# Patient Record
Sex: FEMALE | Race: WHITE | NOT HISPANIC OR LATINO | URBAN - METROPOLITAN AREA
[De-identification: names, ages, dates, MRNs, and addresses within clinical notes are randomized per-mention and may not be internally consistent; named-entity substitution may affect disease eponyms.]

---

## 2022-10-31 ENCOUNTER — EMERGENCY (EMERGENCY)
Facility: HOSPITAL | Age: 52
LOS: 1 days | Discharge: HOME | End: 2022-10-31
Attending: EMERGENCY MEDICINE | Admitting: EMERGENCY MEDICINE

## 2022-10-31 VITALS
HEART RATE: 206 BPM | OXYGEN SATURATION: 100 % | TEMPERATURE: 98 F | WEIGHT: 139.99 LBS | SYSTOLIC BLOOD PRESSURE: 119 MMHG | RESPIRATION RATE: 18 BRPM | DIASTOLIC BLOOD PRESSURE: 81 MMHG | HEIGHT: 69 IN

## 2022-10-31 VITALS
HEART RATE: 79 BPM | DIASTOLIC BLOOD PRESSURE: 78 MMHG | OXYGEN SATURATION: 98 % | SYSTOLIC BLOOD PRESSURE: 125 MMHG | RESPIRATION RATE: 17 BRPM

## 2022-10-31 LAB
ALBUMIN SERPL ELPH-MCNC: 4 G/DL — SIGNIFICANT CHANGE UP (ref 3.4–5)
ALP SERPL-CCNC: 80 U/L — SIGNIFICANT CHANGE UP (ref 40–120)
ALT FLD-CCNC: 36 U/L — SIGNIFICANT CHANGE UP (ref 12–42)
ANION GAP SERPL CALC-SCNC: 12 MMOL/L — SIGNIFICANT CHANGE UP (ref 9–16)
AST SERPL-CCNC: 36 U/L — SIGNIFICANT CHANGE UP (ref 15–37)
BASOPHILS # BLD AUTO: 0.05 K/UL — SIGNIFICANT CHANGE UP (ref 0–0.2)
BASOPHILS NFR BLD AUTO: 0.6 % — SIGNIFICANT CHANGE UP (ref 0–2)
BILIRUB SERPL-MCNC: 0.3 MG/DL — SIGNIFICANT CHANGE UP (ref 0.2–1.2)
BUN SERPL-MCNC: 19 MG/DL — SIGNIFICANT CHANGE UP (ref 7–23)
CALCIUM SERPL-MCNC: 9.4 MG/DL — SIGNIFICANT CHANGE UP (ref 8.5–10.5)
CHLORIDE SERPL-SCNC: 100 MMOL/L — SIGNIFICANT CHANGE UP (ref 96–108)
CO2 SERPL-SCNC: 27 MMOL/L — SIGNIFICANT CHANGE UP (ref 22–31)
CREAT SERPL-MCNC: 0.83 MG/DL — SIGNIFICANT CHANGE UP (ref 0.5–1.3)
EGFR: 85 ML/MIN/1.73M2 — SIGNIFICANT CHANGE UP
EOSINOPHIL # BLD AUTO: 0.08 K/UL — SIGNIFICANT CHANGE UP (ref 0–0.5)
EOSINOPHIL NFR BLD AUTO: 1 % — SIGNIFICANT CHANGE UP (ref 0–6)
GLUCOSE SERPL-MCNC: 137 MG/DL — HIGH (ref 70–99)
HCG SERPL-ACNC: 7 MIU/ML — SIGNIFICANT CHANGE UP
HCT VFR BLD CALC: 40.8 % — SIGNIFICANT CHANGE UP (ref 34.5–45)
HGB BLD-MCNC: 13.4 G/DL — SIGNIFICANT CHANGE UP (ref 11.5–15.5)
IMM GRANULOCYTES NFR BLD AUTO: 0.3 % — SIGNIFICANT CHANGE UP (ref 0–0.9)
LYMPHOCYTES # BLD AUTO: 3.57 K/UL — HIGH (ref 1–3.3)
LYMPHOCYTES # BLD AUTO: 46.2 % — HIGH (ref 13–44)
MAGNESIUM SERPL-MCNC: 1.8 MG/DL — SIGNIFICANT CHANGE UP (ref 1.6–2.6)
MCHC RBC-ENTMCNC: 30 PG — SIGNIFICANT CHANGE UP (ref 27–34)
MCHC RBC-ENTMCNC: 32.8 GM/DL — SIGNIFICANT CHANGE UP (ref 32–36)
MCV RBC AUTO: 91.5 FL — SIGNIFICANT CHANGE UP (ref 80–100)
MONOCYTES # BLD AUTO: 0.68 K/UL — SIGNIFICANT CHANGE UP (ref 0–0.9)
MONOCYTES NFR BLD AUTO: 8.8 % — SIGNIFICANT CHANGE UP (ref 2–14)
NEUTROPHILS # BLD AUTO: 3.33 K/UL — SIGNIFICANT CHANGE UP (ref 1.8–7.4)
NEUTROPHILS NFR BLD AUTO: 43.1 % — SIGNIFICANT CHANGE UP (ref 43–77)
NRBC # BLD: 0 /100 WBCS — SIGNIFICANT CHANGE UP (ref 0–0)
NT-PROBNP SERPL-SCNC: 151 PG/ML — SIGNIFICANT CHANGE UP
PLATELET # BLD AUTO: 225 K/UL — SIGNIFICANT CHANGE UP (ref 150–400)
POTASSIUM SERPL-MCNC: 3.8 MMOL/L — SIGNIFICANT CHANGE UP (ref 3.5–5.3)
POTASSIUM SERPL-SCNC: 3.8 MMOL/L — SIGNIFICANT CHANGE UP (ref 3.5–5.3)
PROT SERPL-MCNC: 7.7 G/DL — SIGNIFICANT CHANGE UP (ref 6.4–8.2)
RBC # BLD: 4.46 M/UL — SIGNIFICANT CHANGE UP (ref 3.8–5.2)
RBC # FLD: 12.5 % — SIGNIFICANT CHANGE UP (ref 10.3–14.5)
SODIUM SERPL-SCNC: 139 MMOL/L — SIGNIFICANT CHANGE UP (ref 132–145)
TROPONIN I, HIGH SENSITIVITY RESULT: 4 NG/L — SIGNIFICANT CHANGE UP
TSH SERPL-MCNC: 7.13 UIU/ML — HIGH (ref 0.36–3.74)
WBC # BLD: 7.73 K/UL — SIGNIFICANT CHANGE UP (ref 3.8–10.5)
WBC # FLD AUTO: 7.73 K/UL — SIGNIFICANT CHANGE UP (ref 3.8–10.5)

## 2022-10-31 PROCEDURE — 93010 ELECTROCARDIOGRAM REPORT: CPT | Mod: NC

## 2022-10-31 PROCEDURE — 99285 EMERGENCY DEPT VISIT HI MDM: CPT

## 2022-10-31 RX ORDER — SODIUM CHLORIDE 9 MG/ML
1000 INJECTION INTRAMUSCULAR; INTRAVENOUS; SUBCUTANEOUS ONCE
Refills: 0 | Status: COMPLETED | OUTPATIENT
Start: 2022-10-31 | End: 2022-10-31

## 2022-10-31 RX ADMIN — SODIUM CHLORIDE 1000 MILLILITER(S): 9 INJECTION INTRAMUSCULAR; INTRAVENOUS; SUBCUTANEOUS at 21:10

## 2022-10-31 NOTE — ED PROVIDER NOTE - PATIENT PORTAL LINK FT
You can access the FollowMyHealth Patient Portal offered by [facility] by registering at the following website: [email or web address]. By joining [facility] [facility] FollowMyHealth portal, you will also be able to view your health information using other applications (apps) compatible with our system.

## 2022-10-31 NOTE — ED PROVIDER NOTE - OBJECTIVE STATEMENT
53yo F no significant pmh presents with palpitations that started just prior to arrival in ED.  Pt's watch told her her heart rate was >200 so she came to ED.  No lightheadedness or syncope.  No chest pain or shortness of breath.  had an extra cup of tea this afternoon, otherwise no significant change in caffeine intake.  No drugs or etoh.  Has had palpitations in the past which have resolved on their own; has never gone to ED for arrhythmia.

## 2022-10-31 NOTE — ED PROVIDER NOTE - CARE PROVIDER_API CALL
Maurilio Bettencourt)  Cardiovascular Disease  [address] [facility], [address] [place] [address]  Phone: [phone]  Fax: [phone]  Follow Up Time: 1-3 Days

## 2022-10-31 NOTE — ED ADULT TRIAGE NOTE - AS HEIGHT TYPE
stated Quality 110: Preventive Care And Screening: Influenza Immunization: Influenza Immunization not Administered because Patient Refused. Quality 128: Preventive Care And Screening: Body Mass Index (Bmi) Screening And Follow-Up Plan: BMI is documented within normal parameters and no follow-up plan is required. Quality 226: Preventive Care And Screening: Tobacco Use: Screening And Cessation Intervention: Patient screened for tobacco and never smoked Detail Level: Detailed Quality 130: Documentation Of Current Medications In The Medical Record: Current Medications Documented

## 2022-10-31 NOTE — ED ADULT TRIAGE NOTE - CHIEF COMPLAINT QUOTE
Pt. walk in c/o palpitations that started suddenly at around 845p, pt. looked at apple watch and saw . Denies CP, n/v, dizziness, SOB.

## 2022-10-31 NOTE — ED PROVIDER NOTE - PHYSICAL EXAMINATION
Constitutional:  anxious appearing, awake, alert, oriented to person, place, time/situation  HEENT: Airway patent, Nasal mucosa clear. Mouth with normal mucosa.   Eyes: Clear bilaterally, pupils equal, round and reactive to light.  Cardiac: tachycardic regular rhythm.  Respiratory: Breath sounds clear and equal bilaterally.  GI: Abdomen soft, non-tender, no guarding.  MSK: Spine appears normal, range of motion is not limited, no muscle or joint tenderness  Neuro: Alert and oriented, no focal deficits, no motor or sensory deficits.  Skin: Skin normal color for race, warm, dry and intact. No evidence of rash.

## 2022-10-31 NOTE — ED PROVIDER NOTE - NSFOLLOWUPINSTRUCTIONS_ED_ALL_ED_FT
Supraventricular Tachycardia, Adult      Supraventricular tachycardia (SVT) is a type of abnormal heart rhythm. It causes the heart to beat very quickly. SVT can start suddenly and last for a short time, which is called paroxysmal SVT, or it may last longer and require specialized treatment to return the heart rhythm to normal.    A normal resting heart rate is 60–100 beats per minute. During an episode of SVT, your heart rate may be higher than 150 beats per minute. Episodes of SVT can be frightening, but they are usually not dangerous. However, if episodes happen several times a day or last longer than a few seconds, they may lead to heart failure.      What are the causes?     Usually, a normal heartbeat starts when an area called the sinoatrial node releases an electrical signal. In SVT, other areas of the heart send out electrical signals that interfere with the signal from the sinoatrial node. The cause of this abnormal electrical activity is not known.      What increases the risk?    You are more likely to develop this condition if you are:  •Middle aged or younger.      •Female.      The following factors may also make you more likely to develop this condition:  •Stress or anxiety.      •Tiredness.      •Smoking.      •Stimulant drugs, such as cocaine and methamphetamine.      •Alcohol.      •Caffeine.      •Pregnancy.    •Having any of these conditions:  •A thyroid condition.      •Diabetes mellitus.      •Obstructive sleep apnea.          What are the signs or symptoms?    Symptoms of this condition include:  •A pounding heart.      •A feeling that the heart is skipping beats (palpitations).      •Weakness.      •Shortness of breath.      •Tightness or pain in your chest.      •Light-headedness or dizziness.      •Anxiety.      •Sweating.      •Nausea.      •Fainting.      •Fatigue or tiredness.      A mild episode may not cause symptoms.      How is this diagnosed?    This condition may be diagnosed based on:  •Your symptoms.      •A physical exam. If you have an episode of SVT during the exam, the health care provider may be able to diagnose SVT by listening to your heart and feeling your pulse.    •Tests. These may include:  •An electrocardiogram (ECG). This test is done to check for problems with electrical activity in the heart.      •A Holter monitor or event monitor test. This test involves wearing a portable device that monitors your heart rate over time.      •An echocardiogram. This test involves taking an image of your heart using sound waves. It is done to rule out other causes of a fast heart rate.      •A stress echocardiogram. This test involves doing an echocardiogram when you are at rest and after exercise.      •Blood tests.      •An electrophysiology study (EPS). This tests the electrical activity in your heart to find where the abnormal heart rhythm is coming from using cardiac catheters.          How is this treated?    This condition may be treated with:•Vagal nerve stimulation. This involves stimulating your vagus nerve, which is a nerve that runs from the chest, through the neck, to the lower part of the brain. Stimulating this nerve can slow down the heart. It is often the first and only treatment that is needed for this condition. Work with your health care provider to find which technique works best for you. Ways to do this treatment include:  •Laying on your back, then holding your breath and pushing, as though you are having a bowel movement.      •Massaging an area on one side of your neck, below your jaw. Do not try this yourself. Only a health care provider should do this. If done the wrong way, it can lead to a stroke.      •Bending forward with your head between your legs.      •Coughing while bending forward with your head between your legs.      •Applying an ice-cold, wet towel to your face.        •Medicines that prevent attacks.      •Medicine to stop an attack. The medicine is given through an IV at the hospital.      •A small electric shock (cardioversion) that stops an attack. Before you get the shock, you will get medicine to make you fall asleep.      •Radiofrequency ablation. In this procedure, a small, thin tube (catheter) is used to send radiofrequency energy to the area of tissue that is causing the rapid heartbeats. The energy kills the cells and helps your heart keep a normal rhythm. You may have this treatment if you have symptoms of SVT often.      If you do not have symptoms, you may not need treatment.      Follow these instructions at home:    Stress     •Avoid stressful situations when possible.    •Find healthy ways of managing stress, such as:  •Taking part in relaxing activities, such as yoga, meditation, or being out in nature.      •Listening to relaxing music.      •Practicing relaxation techniques, such as deep breathing.      •Leading a healthy lifestyle. This involves getting plenty of sleep, exercising, and eating a balanced diet.      •Attending counseling or talk therapy with a mental health professional.          Lifestyle      •Try to get at least 7 hours of sleep each night.      • Do not use any products that contain nicotine or tobacco. These products include cigarettes, chewing tobacco, and vaping devices, such as e-cigarettes. If you need help quitting, ask your health care provider.      • Do not drink alcohol if it triggers episodes of SVT.    •If alcohol does not seem to trigger episodes, limit your alcohol intake. If you drink alcohol:•Limit how much you have to:   •0–1 drink a day for women who are not pregnant.      •0–2 drinks a day for men.        •Know how much alcohol is in your drink. In the [place] one drink equals one 12 oz bottle of beer (355 mL), one 5 oz glass of wine (148 mL), or one 1½ oz glass of hard liquor (44 mL).      •Be aware of how caffeine affects your condition. If caffeine:  •Triggers episodes of SVT, do not eat, drink, or use anything with caffeine in it.      •Does not seem to trigger episodes, consume caffeine in moderation.        • Do not use stimulant drugs. If you need help quitting, talk with your health care provider.      General instructions     •Maintain a healthy weight.    •Exercise regularly. Ask your health care provider to suggest some good activities for you. Aim for one or a combination of the following:  •150 minutes per week of moderate exercise, such as walking or yoga.      •75 minutes per week of vigorous exercise, such as running or swimming.        •Perform vagus nerve stimulation as directed by your health care provider.      •Take over-the-counter and prescription medicines only as told by your health care provider.      •Keep all follow-up visits. This is important.        Contact a health care provider if:    •You have episodes of SVT more often than before.      •Episodes of SVT last longer than before.      •Vagus nerve stimulation is no longer helping.      •You have new symptoms.        Get help right away if:    •You have chest pain.      •Your symptoms get worse.      •You have trouble breathing.      •You have an episode of SVT that lasts longer than 20 minutes.      •You faint.      These symptoms may represent a serious problem that is an emergency. Do not wait to see if the symptoms will go away. Get medical help right away. Call your local emergency services (911 in the [place] Do not drive yourself to the hospital.       Summary    •Supraventricular tachycardia (SVT) is a type of abnormal heart rhythm.      •During an episode of SVT, your heart rate may be higher than 150 beats per minute.      •If you do not have symptoms, you may not need treatment.      This information is not intended to replace advice given to you by your health care provider. Make sure you discuss any questions you have with your health care provider.

## 2022-10-31 NOTE — ED PROVIDER NOTE - CLINICAL SUMMARY MEDICAL DECISION MAKING FREE TEXT BOX
51yo F no significant pmh presents with palpitations which started just prior to arrival in ED.  On exam pt is afebrile, BP wnl, HR >200.  EKG consistent with SVT.  Pt converted to sinus rhythm with vagal maneuvers performed at bedside. 51yo F no significant pmh presents with palpitations which started just prior to arrival in ED.  On exam pt is afebrile, BP wnl, HR >200.  EKG consistent with SVT.  Pt converted to sinus rhythm with vagal maneuvers performed at bedside.  Labs unremarkable.  Trop negative.  Pt feels well after IV hydration and resolution of SVT, wants to go home.  Will refer to cardiology as outpatient.  Stable for dc. 51yo F no significant pmh presents with palpitations which started just prior to arrival in ED.  On exam pt is afebrile, BP wnl, HR >200.  EKG consistent with SVT.  Pt converted to sinus rhythm with vagal maneuvers performed at bedside.  Labs unremarkable.  Trop negative.  Pt feels well after IV hydration and resolution of SVT, wants to go home.  HR remained wnl for >1hr of obs in ED.  Will refer to cardiology as outpatient.  Stable for dc.

## 2022-10-31 NOTE — ED ADULT NURSE NOTE - OBJECTIVE STATEMENT
Pt presents to ED c/o palpitations that began around 2045, apple watch showed a HR of 190. Pt tachy at 200, clinical upgrade called, provider and nurse at bedside. Pt denies any CP/SOB, no dizziness

## 2022-11-03 DIAGNOSIS — R00.2 PALPITATIONS: ICD-10-CM

## 2022-11-03 DIAGNOSIS — I47.1 SUPRAVENTRICULAR TACHYCARDIA: ICD-10-CM

## 2022-11-10 PROBLEM — Z00.00 ENCOUNTER FOR PREVENTIVE HEALTH EXAMINATION: Status: ACTIVE | Noted: 2022-11-10

## 2022-11-15 ENCOUNTER — APPOINTMENT (OUTPATIENT)
Dept: HEART AND VASCULAR | Facility: CLINIC | Age: 52
End: 2022-11-15

## 2022-11-15 ENCOUNTER — NON-APPOINTMENT (OUTPATIENT)
Age: 52
End: 2022-11-15

## 2022-11-15 VITALS
WEIGHT: 142 LBS | TEMPERATURE: 98.3 F | DIASTOLIC BLOOD PRESSURE: 81 MMHG | HEIGHT: 69 IN | HEART RATE: 65 BPM | BODY MASS INDEX: 21.03 KG/M2 | OXYGEN SATURATION: 100 % | SYSTOLIC BLOOD PRESSURE: 119 MMHG

## 2022-11-15 DIAGNOSIS — Z78.9 OTHER SPECIFIED HEALTH STATUS: ICD-10-CM

## 2022-11-15 PROCEDURE — 99204 OFFICE O/P NEW MOD 45 MIN: CPT | Mod: 25

## 2022-11-15 PROCEDURE — 93000 ELECTROCARDIOGRAM COMPLETE: CPT

## 2022-11-15 NOTE — REASON FOR VISIT
[Symptom and Test Evaluation] : symptom and test evaluation [FreeTextEntry1] : 51yo F no significant pmh presents with palpitations\par that started just prior to arrival in ED.  Pt's watch told her her heart rate\par was >200 so she came to ED.  No lightheadedness or syncope.  No chest pain or\par shortness of breath.  had an extra cup of tea this afternoon, otherwise no\par significant change in caffeine intake.  No drugs or etoh.  Has had palpitations\par in the past which have resolved on their own; has never gone to ED for\par arrhythmia.

## 2022-11-15 NOTE — DISCUSSION/SUMMARY
[FreeTextEntry1] : SVT/palp TEMO and I had a discussion of his symptoms. Her risk for CAD falls intro intermediate. We will therefore move forward with a stress ekg and echocardiogram at this time to evaluate for obstructive CAD and risk stratification, provided an insurance approval can be obtained. My preference would be stress echocardiogram, but it is unlikely to be approved at this time. Risks and benefits discussed.\par

## 2022-12-21 ENCOUNTER — APPOINTMENT (OUTPATIENT)
Dept: HEART AND VASCULAR | Facility: CLINIC | Age: 52
End: 2022-12-21

## 2022-12-21 VITALS
WEIGHT: 141.13 LBS | DIASTOLIC BLOOD PRESSURE: 81 MMHG | HEART RATE: 93 BPM | BODY MASS INDEX: 20.9 KG/M2 | SYSTOLIC BLOOD PRESSURE: 116 MMHG | TEMPERATURE: 97.2 F | HEIGHT: 69 IN | OXYGEN SATURATION: 96 %

## 2022-12-21 PROCEDURE — 93306 TTE W/DOPPLER COMPLETE: CPT

## 2022-12-21 PROCEDURE — 99213 OFFICE O/P EST LOW 20 MIN: CPT | Mod: 25

## 2022-12-21 PROCEDURE — 93015 CV STRESS TEST SUPVJ I&R: CPT

## 2022-12-23 NOTE — DISCUSSION/SUMMARY
[FreeTextEntry1] : SVT/palps we discussed moving forward with EPS follow up, avoiding extremes of coffee, etc. Inclined towards a conservative follow up in this patient. We had a careful discussion regarding diet and exercise. Will be happy to re-evaluate.\par

## 2022-12-23 NOTE — REASON FOR VISIT
[Symptom and Test Evaluation] : symptom and test evaluation [FreeTextEntry1] : 53yo F no significant pmh presents with palpitations that started just prior to arrival in ED.  Pt's watch told her her heart rate was >200 so she came to ED.  No lightheadedness or syncope.  No chest pain or\par shortness of breath.  had an extra cup of tea this afternoon, otherwise no significant change in caffeine intake.  No drugs or etoh.  Has had palpitations in the past which have resolved on their own; has never gone to ED for arrhythmia. She completed an echo and stress ekg, as we unable to approve a stress echo. She feels overall well.

## 2023-06-21 ENCOUNTER — NON-APPOINTMENT (OUTPATIENT)
Age: 53
End: 2023-06-21

## 2023-06-21 ENCOUNTER — APPOINTMENT (OUTPATIENT)
Dept: HEART AND VASCULAR | Facility: CLINIC | Age: 53
End: 2023-06-21
Payer: COMMERCIAL

## 2023-06-21 VITALS
SYSTOLIC BLOOD PRESSURE: 113 MMHG | WEIGHT: 140 LBS | TEMPERATURE: 97.5 F | HEIGHT: 69 IN | DIASTOLIC BLOOD PRESSURE: 79 MMHG | BODY MASS INDEX: 20.73 KG/M2 | HEART RATE: 72 BPM | OXYGEN SATURATION: 97 %

## 2023-06-21 PROCEDURE — 99214 OFFICE O/P EST MOD 30 MIN: CPT

## 2023-06-22 NOTE — DISCUSSION/SUMMARY
[FreeTextEntry1] : Palps/SVT Inclined towards a conservative follow up in this patient. We had a careful discussion regarding diet and exercise. Will be happy to re-evaluate.\par RTC 6 m, will discuss utility of repeating imaging depending on symptoms.

## 2023-06-22 NOTE — REASON FOR VISIT
[Symptom and Test Evaluation] : symptom and test evaluation [FreeTextEntry1] : 53yo F no significant pmh presents with palpitations that started just prior to arrival in ED.  Pt's watch told her her heart rate was >200 so she came to ED.  No lightheadedness or syncope.  No chest pain or\par shortness of breath.  had an extra cup of tea this afternoon, otherwise no significant change in caffeine intake.  No drugs or etoh.  Has had palpitations in the past which have resolved on their own; has never gone to ED for arrhythmia. She completed an echo and stress ekg, as we unable to approve a stress echo. She feels overall well. Not much to report since last visit.

## 2023-12-20 ENCOUNTER — APPOINTMENT (OUTPATIENT)
Dept: HEART AND VASCULAR | Facility: CLINIC | Age: 53
End: 2023-12-20
Payer: COMMERCIAL

## 2023-12-20 ENCOUNTER — NON-APPOINTMENT (OUTPATIENT)
Age: 53
End: 2023-12-20

## 2023-12-20 VITALS
DIASTOLIC BLOOD PRESSURE: 57 MMHG | OXYGEN SATURATION: 99 % | HEIGHT: 69 IN | HEART RATE: 62 BPM | BODY MASS INDEX: 20.88 KG/M2 | WEIGHT: 141 LBS | SYSTOLIC BLOOD PRESSURE: 90 MMHG

## 2023-12-20 PROCEDURE — 99214 OFFICE O/P EST MOD 30 MIN: CPT | Mod: 25

## 2023-12-20 PROCEDURE — 93000 ELECTROCARDIOGRAM COMPLETE: CPT

## 2023-12-20 NOTE — REASON FOR VISIT
[Symptom and Test Evaluation] : symptom and test evaluation [FreeTextEntry1] : 53 year old woman with palpitations/SVT comes in for a follow up. NO chest pain noted. NO issues with meds. However, lipids have been trending up with PMD.

## 2023-12-20 NOTE — DISCUSSION/SUMMARY
[FreeTextEntry1] : Palps/SVT check echo if able to approve and schedule. JOSE PLASCENCIA and I discussed her lipid panel and individualized target LDL goal. At this point, will do diet and exercise with anticipation of re-evaluating labs in 3-6 months

## 2024-01-20 ENCOUNTER — APPOINTMENT (OUTPATIENT)
Dept: HEART AND VASCULAR | Facility: CLINIC | Age: 54
End: 2024-01-20
Payer: COMMERCIAL

## 2024-01-20 PROCEDURE — 93306 TTE W/DOPPLER COMPLETE: CPT

## 2024-06-26 ENCOUNTER — NON-APPOINTMENT (OUTPATIENT)
Age: 54
End: 2024-06-26

## 2024-06-26 ENCOUNTER — TRANSCRIPTION ENCOUNTER (OUTPATIENT)
Age: 54
End: 2024-06-26

## 2024-06-26 ENCOUNTER — APPOINTMENT (OUTPATIENT)
Dept: HEART AND VASCULAR | Facility: CLINIC | Age: 54
End: 2024-06-26
Payer: COMMERCIAL

## 2024-06-26 VITALS
SYSTOLIC BLOOD PRESSURE: 107 MMHG | DIASTOLIC BLOOD PRESSURE: 73 MMHG | OXYGEN SATURATION: 99 % | BODY MASS INDEX: 21.04 KG/M2 | HEIGHT: 69 IN | WEIGHT: 142.06 LBS | TEMPERATURE: 98 F | HEART RATE: 66 BPM

## 2024-06-26 DIAGNOSIS — I47.10 SUPRAVENTRICULAR TACHYCARDIA, UNSPECIFIED: ICD-10-CM

## 2024-06-26 DIAGNOSIS — R00.2 PALPITATIONS: ICD-10-CM

## 2024-06-26 DIAGNOSIS — E78.5 HYPERLIPIDEMIA, UNSPECIFIED: ICD-10-CM

## 2024-06-26 PROCEDURE — 99214 OFFICE O/P EST MOD 30 MIN: CPT | Mod: 25

## 2024-06-26 PROCEDURE — 93000 ELECTROCARDIOGRAM COMPLETE: CPT

## 2024-06-26 PROCEDURE — G2211 COMPLEX E/M VISIT ADD ON: CPT | Mod: NC

## 2024-10-25 ENCOUNTER — APPOINTMENT (OUTPATIENT)
Dept: HEART AND VASCULAR | Facility: CLINIC | Age: 54
End: 2024-10-25
Payer: COMMERCIAL

## 2024-10-25 ENCOUNTER — APPOINTMENT (OUTPATIENT)
Dept: CT IMAGING | Facility: CLINIC | Age: 54
End: 2024-10-25
Payer: COMMERCIAL

## 2024-10-25 ENCOUNTER — OUTPATIENT (OUTPATIENT)
Dept: OUTPATIENT SERVICES | Facility: HOSPITAL | Age: 54
LOS: 1 days | End: 2024-10-25

## 2024-10-25 ENCOUNTER — NON-APPOINTMENT (OUTPATIENT)
Age: 54
End: 2024-10-25

## 2024-10-25 PROCEDURE — 75571 CT HRT W/O DYE W/CA TEST: CPT | Mod: 26

## 2024-10-25 PROCEDURE — 93880 EXTRACRANIAL BILAT STUDY: CPT

## 2024-10-30 ENCOUNTER — APPOINTMENT (OUTPATIENT)
Dept: HEART AND VASCULAR | Facility: CLINIC | Age: 54
End: 2024-10-30
Payer: COMMERCIAL

## 2024-10-30 ENCOUNTER — NON-APPOINTMENT (OUTPATIENT)
Age: 54
End: 2024-10-30

## 2024-10-30 VITALS
SYSTOLIC BLOOD PRESSURE: 100 MMHG | WEIGHT: 144.44 LBS | HEART RATE: 66 BPM | TEMPERATURE: 97.6 F | OXYGEN SATURATION: 98 % | BODY MASS INDEX: 21.39 KG/M2 | HEIGHT: 69 IN | DIASTOLIC BLOOD PRESSURE: 69 MMHG

## 2024-10-30 DIAGNOSIS — I47.10 SUPRAVENTRICULAR TACHYCARDIA, UNSPECIFIED: ICD-10-CM

## 2024-10-30 DIAGNOSIS — R00.2 PALPITATIONS: ICD-10-CM

## 2024-10-30 DIAGNOSIS — E78.5 HYPERLIPIDEMIA, UNSPECIFIED: ICD-10-CM

## 2024-10-30 PROCEDURE — 99214 OFFICE O/P EST MOD 30 MIN: CPT

## 2024-10-30 PROCEDURE — G2211 COMPLEX E/M VISIT ADD ON: CPT | Mod: NC

## 2025-04-30 ENCOUNTER — APPOINTMENT (OUTPATIENT)
Dept: HEART AND VASCULAR | Facility: CLINIC | Age: 55
End: 2025-04-30
Payer: COMMERCIAL

## 2025-04-30 VITALS
TEMPERATURE: 98.1 F | HEART RATE: 73 BPM | BODY MASS INDEX: 20.73 KG/M2 | OXYGEN SATURATION: 100 % | WEIGHT: 140 LBS | SYSTOLIC BLOOD PRESSURE: 105 MMHG | DIASTOLIC BLOOD PRESSURE: 71 MMHG | HEIGHT: 69 IN

## 2025-04-30 DIAGNOSIS — E78.5 HYPERLIPIDEMIA, UNSPECIFIED: ICD-10-CM

## 2025-04-30 DIAGNOSIS — R00.2 PALPITATIONS: ICD-10-CM

## 2025-04-30 DIAGNOSIS — I47.10 SUPRAVENTRICULAR TACHYCARDIA, UNSPECIFIED: ICD-10-CM

## 2025-04-30 PROCEDURE — 99214 OFFICE O/P EST MOD 30 MIN: CPT | Mod: 25

## 2025-04-30 PROCEDURE — 93306 TTE W/DOPPLER COMPLETE: CPT
